# Patient Record
Sex: MALE | Race: WHITE | NOT HISPANIC OR LATINO | Employment: FULL TIME | URBAN - METROPOLITAN AREA
[De-identification: names, ages, dates, MRNs, and addresses within clinical notes are randomized per-mention and may not be internally consistent; named-entity substitution may affect disease eponyms.]

---

## 2020-07-13 ENCOUNTER — OFFICE VISIT (OUTPATIENT)
Dept: URGENT CARE | Facility: CLINIC | Age: 30
End: 2020-07-13
Payer: COMMERCIAL

## 2020-07-13 ENCOUNTER — HOSPITAL ENCOUNTER (OUTPATIENT)
Facility: MEDICAL CENTER | Age: 30
End: 2020-07-13
Attending: FAMILY MEDICINE
Payer: COMMERCIAL

## 2020-07-13 VITALS
WEIGHT: 187 LBS | SYSTOLIC BLOOD PRESSURE: 120 MMHG | HEART RATE: 80 BPM | RESPIRATION RATE: 16 BRPM | DIASTOLIC BLOOD PRESSURE: 76 MMHG | TEMPERATURE: 99 F | OXYGEN SATURATION: 96 %

## 2020-07-13 DIAGNOSIS — R19.7 DIARRHEA, UNSPECIFIED TYPE: ICD-10-CM

## 2020-07-13 PROCEDURE — 99203 OFFICE O/P NEW LOW 30 MIN: CPT | Performed by: FAMILY MEDICINE

## 2020-07-13 PROCEDURE — U0003 INFECTIOUS AGENT DETECTION BY NUCLEIC ACID (DNA OR RNA); SEVERE ACUTE RESPIRATORY SYNDROME CORONAVIRUS 2 (SARS-COV-2) (CORONAVIRUS DISEASE [COVID-19]), AMPLIFIED PROBE TECHNIQUE, MAKING USE OF HIGH THROUGHPUT TECHNOLOGIES AS DESCRIBED BY CMS-2020-01-R: HCPCS

## 2020-07-14 LAB — COVID ORDER STATUS COVID19: NORMAL

## 2020-07-14 NOTE — PROGRESS NOTES
"Cc:   Diarrhea        HPI:      Pt had a couple of episodes of nonbloody diarrhea 2 days ago.   States that the diarrhea is mostly resolved, last episode was this morning.   Denies abd pain.   He is worried about potential COVID.   He will be seeing family shortly and wants to ensure that he does not have it.   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   He also had some chest \"pressure\", but now also resolved.   Denies cough,   or fever.    Also had a mild sore throat a couple of days ago, but now resolved      Past medical history was unremarkable and not pertinent to current issue  Social hx - denies tobacco, alcohol, drug use  Family hx was reviewed - no pertinent past family hx      Review of Systems   Constitutional: Negative for fever, chills and malaise/fatigue.   Eyes: Negative for vision changes, d/c.    HENT: Negative for sneezing, runny nose  Respiratory: Negative for cough and sputum production.    Cardiovascular: Negative for chest pain and palpitations.   Gastrointestinal: Negative for nausea, vomiting, abdominal pain, diarrhea and constipation.   Genitourinary: Negative for dysuria, urgency and frequency.   Ext - no leg pain or edema.   Skin: Negative for rash or  itching.   Neurological: Negative for dizziness and tingling.   Psychiatric/Behavioral: Negative for depression.   Hematologic/lymphatic - denies bruising or excessive bleeding  All other systems reviewed and are negative.      OBJECTIVE  /76 (BP Location: Left arm)   Pulse 80   Temp 37.2 °C (99 °F) (Temporal)   Resp 16   Wt 84.8 kg (187 lb)   SpO2 96%   HEENT - PERRLA, EOMI  Throat:  No posterior pharyngeal edema or erythema.   Tonsils were not visualized.   No exudate  Nose:  No congestion or discharge  Neuro - alert and oriented x3. CN 2-12 grossly intact.  Lungs - CTA. No wheezes, rhonchi or rales.  Heart - regular rate and rhythm without murmur.  Abdomen - soft and non-tender, bowel " sounds active x4.  Musculoskeletal - No lower extremity edema noted.  [place]'s sign negative bilaterally      A/P:    1. 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

## 2020-07-15 LAB
SARS-COV-2 RNA RESP QL NAA+PROBE: NOTDETECTED
SPECIMEN SOURCE: NORMAL